# Patient Record
Sex: MALE | NOT HISPANIC OR LATINO
[De-identification: names, ages, dates, MRNs, and addresses within clinical notes are randomized per-mention and may not be internally consistent; named-entity substitution may affect disease eponyms.]

---
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
## 2022-08-31 NOTE — ASSESSMENT
[FreeTextEntry1] : Education with patient.\par All questions answered for patient and mother.\par  Yes

## 2022-10-18 ENCOUNTER — APPOINTMENT (OUTPATIENT)
Dept: DERMATOLOGY | Facility: CLINIC | Age: 22
End: 2022-10-18

## 2022-10-18 DIAGNOSIS — D22.9 MELANOCYTIC NEVI, UNSPECIFIED: ICD-10-CM

## 2022-10-18 DIAGNOSIS — Z12.83 ENCOUNTER FOR SCREENING FOR MALIGNANT NEOPLASM OF SKIN: ICD-10-CM

## 2022-10-18 DIAGNOSIS — L70.0 ACNE VULGARIS: ICD-10-CM

## 2022-10-18 PROCEDURE — 99213 OFFICE O/P EST LOW 20 MIN: CPT

## 2023-12-12 ENCOUNTER — APPOINTMENT (OUTPATIENT)
Dept: DERMATOLOGY | Facility: CLINIC | Age: 23
End: 2023-12-12
Payer: MEDICAID

## 2023-12-12 DIAGNOSIS — L30.9 DERMATITIS, UNSPECIFIED: ICD-10-CM

## 2023-12-12 PROCEDURE — 11102 TANGNTL BX SKIN SINGLE LES: CPT

## 2023-12-12 PROCEDURE — 99214 OFFICE O/P EST MOD 30 MIN: CPT | Mod: 25

## 2023-12-12 RX ORDER — CLOBETASOL PROPIONATE 0.5 MG/G
0.05 OINTMENT TOPICAL
Qty: 1 | Refills: 2 | Status: ACTIVE | COMMUNITY
Start: 2023-12-12 | End: 1900-01-01

## 2023-12-18 ENCOUNTER — NON-APPOINTMENT (OUTPATIENT)
Age: 23
End: 2023-12-18

## 2023-12-18 LAB — DERMATOLOGY BIOPSY: NORMAL

## 2023-12-19 NOTE — PHYSICAL EXAM
[FreeTextEntry3] :  General: well appearing person in nad, alert, pleasant; here with mom Skin: 4 mm x 4 mm exophytic bright red papule with slight collarette of scale on the L palm (see photo, 12/12/2023)  Well-demarcated bright red scaly oval thin, scaly plaques on the posterior neck with scattering of pinpoint, small pinpoint bright red macules on dermoscopy (see photo, 12/12/2023)

## 2023-12-19 NOTE — HISTORY OF PRESENT ILLNESS
[FreeTextEntry1] : RPA: Wart on the hand and rash on the neck [de-identified] : 23-year-old male last seen by Dr. Bonilla 7 weeks ago, who presents today with his Mom for evaluation of the above. Rash on the posterior neck started several months ago. He has been applying HCT 2.5% cream to the area for months. He also started applying his Mother's Keto 2% cream to the area BID ~ 2 weeks ago. Feels that area is worsening, very itchy. History of eczematous dermatitis. Patch testing in the past + for linalool and limonene.  Also curious about a wart on the left palm x months. It was very small initially but has grown. Asx except when manipulated when it is slightly tender to palpation. Denies bleeding or trauma. No treatments tried.   Other derm history: Verrucous keratosis at the R elbow, s/p shave bx Jan 2020.  Family Hx: no family history of skin cancer Soc Hx: Graduating [employer] this semester (early), with degree in economics. 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

## 2023-12-19 NOTE — ASSESSMENT
[FreeTextEntry1] : 1. Neoplasm of unknown biological significance, on the left palm - Suspicious for pyogenic granuloma - Biopsy was performed today for histologic correlation. Will contact Patient with results and plan treatment considering histologic findings.  Shave Biopsy: All side effects including pain, bleeding, infection, scar, recurrence, nerve damage were discussed and consent was obtained. We anesthetized the area with 1% lidocaine/epinephrine. The lesion was biopsied with a Dermablade. Hemostasis was achieved with cautery.  Wound care was provided, and the tissue was submitted to pathology for evaluation.  2. Dermatitis of uncertain etiology, posterior neck Favor eczematous given clinical presentation, especially likely given history of eczematous dermatitis vs. contact >> psoriasiform dermatitis vs. infectious.  - Education, counseling.  - Stop HCT 2.5% cream and Keto 2% cream and start clobetasol prop ointment to affected area BID x 2 weeks, then stop. SED including atrophy, dyspigmentation, telangiectasias, striae. Proper use reviewed including only using to affected area and avoidance of prolonged use. - RTC 1-2 months to f/u and ensure resolution.

## 2024-01-03 ENCOUNTER — LABORATORY RESULT (OUTPATIENT)
Age: 24
End: 2024-01-03

## 2024-01-04 ENCOUNTER — APPOINTMENT (OUTPATIENT)
Dept: DERMATOLOGY | Facility: CLINIC | Age: 24
End: 2024-01-04
Payer: MEDICAID

## 2024-01-04 DIAGNOSIS — D48.5 NEOPLASM OF UNCERTAIN BEHAVIOR OF SKIN: ICD-10-CM

## 2024-01-04 DIAGNOSIS — I78.1 NEVUS, NON-NEOPLASTIC: ICD-10-CM

## 2024-01-04 PROCEDURE — 11102 TANGNTL BX SKIN SINGLE LES: CPT

## 2024-01-04 PROCEDURE — 99213 OFFICE O/P EST LOW 20 MIN: CPT | Mod: 25

## 2024-01-10 ENCOUNTER — NON-APPOINTMENT (OUTPATIENT)
Age: 24
End: 2024-01-10

## 2024-02-12 ENCOUNTER — APPOINTMENT (OUTPATIENT)
Dept: DERMATOLOGY | Facility: CLINIC | Age: 24
End: 2024-02-12

## 2024-03-20 ENCOUNTER — NON-APPOINTMENT (OUTPATIENT)
Age: 24
End: 2024-03-20

## 2024-03-21 ENCOUNTER — APPOINTMENT (OUTPATIENT)
Dept: DERMATOLOGY | Facility: CLINIC | Age: 24
End: 2024-03-21

## 2024-03-22 RX ORDER — CLINDAMYCIN PHOSPHATE AND BENZOYL PEROXIDE 10; 25 MG/G; MG/G
1.2-2.5 GEL TOPICAL
Qty: 1 | Refills: 0 | Status: ACTIVE | COMMUNITY
Start: 2024-03-20

## 2024-04-11 ENCOUNTER — APPOINTMENT (OUTPATIENT)
Dept: DERMATOLOGY | Facility: CLINIC | Age: 24
End: 2024-04-11
Payer: MEDICAID

## 2024-04-11 DIAGNOSIS — L21.9 SEBORRHEIC DERMATITIS, UNSPECIFIED: ICD-10-CM

## 2024-04-11 PROCEDURE — 99214 OFFICE O/P EST MOD 30 MIN: CPT

## 2024-04-11 RX ORDER — HYDROCORTISONE 25 MG/G
2.5 OINTMENT TOPICAL
Qty: 1 | Refills: 1 | Status: ACTIVE | COMMUNITY
Start: 2023-01-14 | End: 1900-01-01

## 2024-04-11 NOTE — PHYSICAL EXAM
[Alert] : alert [Oriented x 3] : ~L oriented x 3 [Well Nourished] : well nourished [Conjunctiva Non-injected] : conjunctiva non-injected [No Visual Lymphadenopathy] : no visual  lymphadenopathy [No Clubbing] : no clubbing [No Edema] : no edema [No Bromhidrosis] : no bromhidrosis [No Chromhidrosis] : no chromhidrosis [FreeTextEntry3] : Pink brown thin plaque on the forehead

## 2024-04-11 NOTE — HISTORY OF PRESENT ILLNESS
[FreeTextEntry1] : spot on forehead [de-identified] : 23M with a hx of eczema and seb derm of the scalp here for spot on the forehead. Present x weeks. Was using OTC acne medication + benzaclin without improvement.